# Patient Record
Sex: MALE | Race: WHITE | NOT HISPANIC OR LATINO | Employment: FULL TIME | URBAN - METROPOLITAN AREA
[De-identification: names, ages, dates, MRNs, and addresses within clinical notes are randomized per-mention and may not be internally consistent; named-entity substitution may affect disease eponyms.]

---

## 2023-04-17 ENCOUNTER — HOSPITAL ENCOUNTER (EMERGENCY)
Facility: HOSPITAL | Age: 58
Discharge: HOME | End: 2023-04-17
Attending: EMERGENCY MEDICINE
Payer: COMMERCIAL

## 2023-04-17 VITALS
BODY MASS INDEX: 33.3 KG/M2 | OXYGEN SATURATION: 99 % | TEMPERATURE: 98 F | HEIGHT: 71 IN | WEIGHT: 237.88 LBS | SYSTOLIC BLOOD PRESSURE: 131 MMHG | RESPIRATION RATE: 18 BRPM | HEART RATE: 63 BPM | DIASTOLIC BLOOD PRESSURE: 82 MMHG
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
## 2023-04-17 DIAGNOSIS — R07.89 CHEST WALL PAIN: ICD-10-CM

## 2023-04-17 DIAGNOSIS — S22.22XA CLOSED FRACTURE OF BODY OF STERNUM, INITIAL ENCOUNTER: Primary | ICD-10-CM

## 2023-04-17 DIAGNOSIS — R07.9 CHEST PAIN: ICD-10-CM

## 2023-04-17 LAB
ALBUMIN SERPL BCP-MCNC: 4.5 G/DL (ref 3.5–5.2)
ALP SERPL-CCNC: 61 U/L (ref 55–135)
ALT SERPL W/O P-5'-P-CCNC: 41 U/L (ref 10–44)
ANION GAP SERPL CALC-SCNC: 8 MMOL/L (ref 8–16)
AST SERPL-CCNC: 38 U/L (ref 10–40)
BASOPHILS # BLD AUTO: 0.07 K/UL (ref 0–0.2)
BASOPHILS NFR BLD: 0.7 % (ref 0–1.9)
BILIRUB SERPL-MCNC: 0.5 MG/DL (ref 0.1–1)
BNP SERPL-MCNC: 10 PG/ML (ref 0–99)
BUN SERPL-MCNC: 13 MG/DL (ref 6–20)
CALCIUM SERPL-MCNC: 9.5 MG/DL (ref 8.7–10.5)
CHLORIDE SERPL-SCNC: 105 MMOL/L (ref 95–110)
CK SERPL-CCNC: 608 U/L (ref 20–200)
CO2 SERPL-SCNC: 26 MMOL/L (ref 23–29)
CREAT SERPL-MCNC: 1 MG/DL (ref 0.5–1.4)
DIFFERENTIAL METHOD: ABNORMAL
EOSINOPHIL # BLD AUTO: 0.1 K/UL (ref 0–0.5)
EOSINOPHIL NFR BLD: 1.3 % (ref 0–8)
ERYTHROCYTE [DISTWIDTH] IN BLOOD BY AUTOMATED COUNT: 12.3 % (ref 11.5–14.5)
EST. GFR  (NO RACE VARIABLE): >60 ML/MIN/1.73 M^2
GLUCOSE SERPL-MCNC: 93 MG/DL (ref 70–110)
HCT VFR BLD AUTO: 44.6 % (ref 40–54)
HGB BLD-MCNC: 15.3 G/DL (ref 14–18)
IMM GRANULOCYTES # BLD AUTO: 0.02 K/UL (ref 0–0.04)
IMM GRANULOCYTES NFR BLD AUTO: 0.2 % (ref 0–0.5)
LYMPHOCYTES # BLD AUTO: 3.8 K/UL (ref 1–4.8)
LYMPHOCYTES NFR BLD: 40.7 % (ref 18–48)
MCH RBC QN AUTO: 31.2 PG (ref 27–31)
MCHC RBC AUTO-ENTMCNC: 34.3 G/DL (ref 32–36)
MCV RBC AUTO: 91 FL (ref 82–98)
MONOCYTES # BLD AUTO: 0.7 K/UL (ref 0.3–1)
MONOCYTES NFR BLD: 7.5 % (ref 4–15)
NEUTROPHILS # BLD AUTO: 4.6 K/UL (ref 1.8–7.7)
NEUTROPHILS NFR BLD: 49.6 % (ref 38–73)
NRBC BLD-RTO: 0 /100 WBC
PLATELET # BLD AUTO: 188 K/UL (ref 150–450)
PMV BLD AUTO: 13 FL (ref 9.2–12.9)
POTASSIUM SERPL-SCNC: 4 MMOL/L (ref 3.5–5.1)
PROT SERPL-MCNC: 7.3 G/DL (ref 6–8.4)
RBC # BLD AUTO: 4.9 M/UL (ref 4.6–6.2)
SODIUM SERPL-SCNC: 139 MMOL/L (ref 136–145)
TROPONIN I SERPL DL<=0.01 NG/ML-MCNC: <0.006 NG/ML (ref 0–0.03)
WBC # BLD AUTO: 9.34 K/UL (ref 3.9–12.7)

## 2023-04-17 PROCEDURE — 93005 ELECTROCARDIOGRAM TRACING: CPT | Mod: ER

## 2023-04-17 PROCEDURE — 25500020 PHARM REV CODE 255: Mod: ER | Performed by: EMERGENCY MEDICINE

## 2023-04-17 PROCEDURE — 93010 EKG 12-LEAD: ICD-10-PCS | Mod: ,,, | Performed by: INTERNAL MEDICINE

## 2023-04-17 PROCEDURE — 84484 ASSAY OF TROPONIN QUANT: CPT | Mod: ER | Performed by: EMERGENCY MEDICINE

## 2023-04-17 PROCEDURE — 82550 ASSAY OF CK (CPK): CPT | Mod: ER | Performed by: EMERGENCY MEDICINE

## 2023-04-17 PROCEDURE — 85025 COMPLETE CBC W/AUTO DIFF WBC: CPT | Mod: ER | Performed by: EMERGENCY MEDICINE

## 2023-04-17 PROCEDURE — 93010 ELECTROCARDIOGRAM REPORT: CPT | Mod: ,,, | Performed by: INTERNAL MEDICINE

## 2023-04-17 PROCEDURE — 83880 ASSAY OF NATRIURETIC PEPTIDE: CPT | Mod: ER | Performed by: EMERGENCY MEDICINE

## 2023-04-17 PROCEDURE — 80053 COMPREHEN METABOLIC PANEL: CPT | Mod: ER | Performed by: EMERGENCY MEDICINE

## 2023-04-17 PROCEDURE — 99285 EMERGENCY DEPT VISIT HI MDM: CPT | Mod: 25,ER

## 2023-04-17 RX ORDER — OXYCODONE AND ACETAMINOPHEN 10; 325 MG/1; MG/1
1 TABLET ORAL EVERY 6 HOURS PRN
Qty: 12 TABLET | Refills: 0 | Status: SHIPPED | OUTPATIENT
Start: 2023-04-17

## 2023-04-17 RX ADMIN — IOHEXOL 75 ML: 350 INJECTION, SOLUTION INTRAVENOUS at 08:04

## 2023-04-18 ENCOUNTER — TELEPHONE (OUTPATIENT)
Dept: ORTHOPEDICS | Facility: CLINIC | Age: 58
End: 2023-04-18
Payer: COMMERCIAL

## 2023-04-18 NOTE — TELEPHONE ENCOUNTER
Spoke with patient and scheduled an appointment with Dr. Sudhakar Blevins for his sternum fracture per Dr Katz. Patient verbalized understanding of appointment date, time and location.
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
## 2023-04-27 ENCOUNTER — OFFICE VISIT (OUTPATIENT)
Dept: CARDIOTHORACIC SURGERY | Facility: CLINIC | Age: 58
End: 2023-04-27
Payer: COMMERCIAL

## 2023-04-27 VITALS
HEART RATE: 70 BPM | DIASTOLIC BLOOD PRESSURE: 78 MMHG | SYSTOLIC BLOOD PRESSURE: 118 MMHG | HEIGHT: 71 IN | BODY MASS INDEX: 32.87 KG/M2 | WEIGHT: 234.81 LBS | OXYGEN SATURATION: 94 %

## 2023-04-27 DIAGNOSIS — S22.20XA CLOSED FRACTURE OF STERNUM, UNSPECIFIED PORTION OF STERNUM, INITIAL ENCOUNTER: Primary | ICD-10-CM

## 2023-04-27 PROCEDURE — 99999 PR PBB SHADOW E&M-EST. PATIENT-LVL III: ICD-10-PCS | Mod: PBBFAC,,, | Performed by: THORACIC SURGERY (CARDIOTHORACIC VASCULAR SURGERY)

## 2023-04-27 PROCEDURE — 3074F SYST BP LT 130 MM HG: CPT | Mod: CPTII,S$GLB,, | Performed by: THORACIC SURGERY (CARDIOTHORACIC VASCULAR SURGERY)

## 2023-04-27 PROCEDURE — 99203 OFFICE O/P NEW LOW 30 MIN: CPT | Mod: S$GLB,,, | Performed by: THORACIC SURGERY (CARDIOTHORACIC VASCULAR SURGERY)

## 2023-04-27 PROCEDURE — 3074F PR MOST RECENT SYSTOLIC BLOOD PRESSURE < 130 MM HG: ICD-10-PCS | Mod: CPTII,S$GLB,, | Performed by: THORACIC SURGERY (CARDIOTHORACIC VASCULAR SURGERY)

## 2023-04-27 PROCEDURE — 3008F PR BODY MASS INDEX (BMI) DOCUMENTED: ICD-10-PCS | Mod: CPTII,S$GLB,, | Performed by: THORACIC SURGERY (CARDIOTHORACIC VASCULAR SURGERY)

## 2023-04-27 PROCEDURE — 3078F DIAST BP <80 MM HG: CPT | Mod: CPTII,S$GLB,, | Performed by: THORACIC SURGERY (CARDIOTHORACIC VASCULAR SURGERY)

## 2023-04-27 PROCEDURE — 3008F BODY MASS INDEX DOCD: CPT | Mod: CPTII,S$GLB,, | Performed by: THORACIC SURGERY (CARDIOTHORACIC VASCULAR SURGERY)

## 2023-04-27 PROCEDURE — 99999 PR PBB SHADOW E&M-EST. PATIENT-LVL III: CPT | Mod: PBBFAC,,, | Performed by: THORACIC SURGERY (CARDIOTHORACIC VASCULAR SURGERY)

## 2023-04-27 PROCEDURE — 3078F PR MOST RECENT DIASTOLIC BLOOD PRESSURE < 80 MM HG: ICD-10-PCS | Mod: CPTII,S$GLB,, | Performed by: THORACIC SURGERY (CARDIOTHORACIC VASCULAR SURGERY)

## 2023-04-27 PROCEDURE — 99203 PR OFFICE/OUTPT VISIT, NEW, LEVL III, 30-44 MIN: ICD-10-PCS | Mod: S$GLB,,, | Performed by: THORACIC SURGERY (CARDIOTHORACIC VASCULAR SURGERY)

## 2023-04-27 RX ORDER — ATORVASTATIN CALCIUM 10 MG/1
10 TABLET, FILM COATED ORAL DAILY
COMMUNITY
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
## 2023-05-09 ENCOUNTER — TELEPHONE (OUTPATIENT)
Dept: CARDIOLOGY | Facility: CLINIC | Age: 58
End: 2023-05-09
Payer: COMMERCIAL

## 2023-05-09 NOTE — TELEPHONE ENCOUNTER
Patient contacted the office and was advised that he can drop off the paper work to the [place] location. The patient was advised that the provider was not available to look at the paper work today. He will look at the paper work tomorrow. The patient stated understanding with no questions or concern.    ----- Message from Jai Washington sent at 5/5/2023 12:31 PM CDT -----  Contact: Abdullahi  Patient is calling to speak with the nurse in regards to paperwork. Report checking receipt of paperwork for fmla/std from employer. Please give patient a callback at [phone].

## 2023-10-03 ENCOUNTER — HOSPITAL ENCOUNTER (EMERGENCY)
Facility: HOSPITAL | Age: 58
Discharge: HOME | End: 2023-10-03
Attending: EMERGENCY MEDICINE
Payer: COMMERCIAL

## 2023-10-03 VITALS
DIASTOLIC BLOOD PRESSURE: 71 MMHG | RESPIRATION RATE: 17 BRPM | SYSTOLIC BLOOD PRESSURE: 141 MMHG | BODY MASS INDEX: 33.96 KG/M2 | TEMPERATURE: 98 F | HEART RATE: 66 BPM | OXYGEN SATURATION: 99 % | WEIGHT: 243.5 LBS

## 2023-10-03 DIAGNOSIS — R07.9 CHEST PAIN: ICD-10-CM

## 2023-10-03 DIAGNOSIS — R09.1 PLEURITIS: Primary | ICD-10-CM

## 2023-10-03 LAB
ALBUMIN SERPL BCP-MCNC: 4.3 G/DL (ref 3.5–5.2)
ALP SERPL-CCNC: 52 U/L (ref 55–135)
ALT SERPL W/O P-5'-P-CCNC: 41 U/L (ref 10–44)
ANION GAP SERPL CALC-SCNC: 9 MMOL/L (ref 8–16)
AST SERPL-CCNC: 31 U/L (ref 10–40)
BASOPHILS # BLD AUTO: 0.07 K/UL (ref 0–0.2)
BASOPHILS NFR BLD: 0.8 % (ref 0–1.9)
BILIRUB SERPL-MCNC: 0.4 MG/DL (ref 0.1–1)
BNP SERPL-MCNC: <10 PG/ML (ref 0–99)
BUN SERPL-MCNC: 26 MG/DL (ref 6–20)
CALCIUM SERPL-MCNC: 8.9 MG/DL (ref 8.7–10.5)
CHLORIDE SERPL-SCNC: 109 MMOL/L (ref 95–110)
CO2 SERPL-SCNC: 22 MMOL/L (ref 23–29)
CREAT SERPL-MCNC: 2 MG/DL (ref 0.5–1.4)
DIFFERENTIAL METHOD: ABNORMAL
EOSINOPHIL # BLD AUTO: 0.2 K/UL (ref 0–0.5)
EOSINOPHIL NFR BLD: 2 % (ref 0–8)
ERYTHROCYTE [DISTWIDTH] IN BLOOD BY AUTOMATED COUNT: 12.2 % (ref 11.5–14.5)
EST. GFR  (NO RACE VARIABLE): 38 ML/MIN/1.73 M^2
GLUCOSE SERPL-MCNC: 100 MG/DL (ref 70–110)
HCT VFR BLD AUTO: 45.1 % (ref 40–54)
HGB BLD-MCNC: 15.3 G/DL (ref 14–18)
IMM GRANULOCYTES # BLD AUTO: 0.03 K/UL (ref 0–0.04)
IMM GRANULOCYTES NFR BLD AUTO: 0.3 % (ref 0–0.5)
LYMPHOCYTES # BLD AUTO: 3.8 K/UL (ref 1–4.8)
LYMPHOCYTES NFR BLD: 42.3 % (ref 18–48)
MCH RBC QN AUTO: 31 PG (ref 27–31)
MCHC RBC AUTO-ENTMCNC: 33.9 G/DL (ref 32–36)
MCV RBC AUTO: 92 FL (ref 82–98)
MONOCYTES # BLD AUTO: 0.8 K/UL (ref 0.3–1)
MONOCYTES NFR BLD: 9.3 % (ref 4–15)
NEUTROPHILS # BLD AUTO: 4.1 K/UL (ref 1.8–7.7)
NEUTROPHILS NFR BLD: 45.3 % (ref 38–73)
NRBC BLD-RTO: 0 /100 WBC
PLATELET # BLD AUTO: 166 K/UL (ref 150–450)
PMV BLD AUTO: 13 FL (ref 9.2–12.9)
POTASSIUM SERPL-SCNC: 3.9 MMOL/L (ref 3.5–5.1)
PROT SERPL-MCNC: 7.1 G/DL (ref 6–8.4)
RBC # BLD AUTO: 4.93 M/UL (ref 4.6–6.2)
SODIUM SERPL-SCNC: 140 MMOL/L (ref 136–145)
TROPONIN I SERPL DL<=0.01 NG/ML-MCNC: <0.006 NG/ML (ref 0–0.03)
TROPONIN I SERPL DL<=0.01 NG/ML-MCNC: <0.006 NG/ML (ref 0–0.03)
WBC # BLD AUTO: 9.08 K/UL (ref 3.9–12.7)

## 2023-10-03 PROCEDURE — 83880 ASSAY OF NATRIURETIC PEPTIDE: CPT | Performed by: EMERGENCY MEDICINE

## 2023-10-03 PROCEDURE — 25000003 PHARM REV CODE 250: Mod: ER | Performed by: EMERGENCY MEDICINE

## 2023-10-03 PROCEDURE — 96360 HYDRATION IV INFUSION INIT: CPT | Mod: 59,ER

## 2023-10-03 PROCEDURE — 85025 COMPLETE CBC W/AUTO DIFF WBC: CPT | Mod: ER | Performed by: EMERGENCY MEDICINE

## 2023-10-03 PROCEDURE — 93010 EKG 12-LEAD: ICD-10-PCS | Mod: ,,, | Performed by: INTERNAL MEDICINE

## 2023-10-03 PROCEDURE — 93010 ELECTROCARDIOGRAM REPORT: CPT | Mod: ,,, | Performed by: INTERNAL MEDICINE

## 2023-10-03 PROCEDURE — 99285 EMERGENCY DEPT VISIT HI MDM: CPT | Mod: 25,ER

## 2023-10-03 PROCEDURE — 84484 ASSAY OF TROPONIN QUANT: CPT | Mod: ER | Performed by: EMERGENCY MEDICINE

## 2023-10-03 PROCEDURE — 84484 ASSAY OF TROPONIN QUANT: CPT | Mod: 91 | Performed by: EMERGENCY MEDICINE

## 2023-10-03 PROCEDURE — 25500020 PHARM REV CODE 255: Mod: ER | Performed by: EMERGENCY MEDICINE

## 2023-10-03 PROCEDURE — 94761 N-INVAS EAR/PLS OXIMETRY MLT: CPT | Mod: ER

## 2023-10-03 PROCEDURE — 93005 ELECTROCARDIOGRAM TRACING: CPT | Mod: ER

## 2023-10-03 PROCEDURE — 80053 COMPREHEN METABOLIC PANEL: CPT | Performed by: EMERGENCY MEDICINE

## 2023-10-03 RX ORDER — ASPIRIN 325 MG
325 TABLET ORAL
Status: COMPLETED | OUTPATIENT
Start: 2023-10-03 | End: 2023-10-03

## 2023-10-03 RX ORDER — ROSUVASTATIN CALCIUM 10 MG/1
10 TABLET, COATED ORAL NIGHTLY
COMMUNITY
Start: 2023-04-18

## 2023-10-03 RX ADMIN — ASPIRIN 325 MG: 325 TABLET ORAL at 05:10

## 2023-10-03 RX ADMIN — SODIUM CHLORIDE 1000 ML: 9 INJECTION, SOLUTION INTRAVENOUS at 09:10

## 2023-10-03 RX ADMIN — IOHEXOL 100 ML: 350 INJECTION, SOLUTION INTRAVENOUS at 06:10

## 2023-10-03 RX ADMIN — SODIUM CHLORIDE 1000 ML: 9 INJECTION, SOLUTION INTRAVENOUS at 07:10

## 2023-10-03 NOTE — ED PROVIDER NOTES
Encounter Date: 10/3/2023       History     Chief Complaint   Patient presents with    Shortness of Breath     Worsening SOB x1 week. Sent by PCP for evaluation of possible PE    Chest Pain     Throbbing mid sternal chest pain     The history is provided by the patient.   Chest Pain  The current episode started several days ago. Duration of episode(s) is 1 week. Chest pain occurs constantly. The chest pain is improving. The pain is associated with breathing and coughing. Pain scale at highest: mild. Pain scale currently: mild. The quality of the pain is described as dull and aching. The pain does not radiate. Chest pain is worsened by deep breathing. Primary symptoms include shortness of breath and cough (occasional, improved at this point in time per pt report). Pertinent negatives for primary symptoms include no fever, no fatigue, no syncope, no wheezing, no palpitations, no abdominal pain, no nausea, no vomiting, no dizziness and no altered mental status.   The shortness of breath began  more than 2 days ago (pt just drove back from [place] last week). The shortness of breath developed gradually. The shortness of breath is mild. The patient's medical history does not include CHF, COPD, asthma or chronic lung disease.   The cough is non-productive. There is nondescript sputum produced.   Pertinent negatives for associated symptoms include no claudication, no diaphoresis, no lower extremity edema, no near-syncope, no numbness, no orthopnea, no paroxysmal nocturnal dyspnea and no weakness. He tried nothing for the symptoms. Risk factors include male gender.   His past medical history is significant for hyperlipidemia and hypertension.     Review of patient's allergies indicates:  No Known Allergies  Past Medical History:   Diagnosis Date    High cholesterol     Hypertension      History reviewed. No pertinent surgical history.  Family History   Problem Relation Age of Onset    Colon cancer Mother     Lung cancer  Mother     Diabetes Father      Social History     Tobacco Use    Smoking status: Unknown     Passive exposure: Past    Smokeless tobacco: Never   Substance Use Topics    Alcohol use: Yes     Alcohol/week: 1.0 standard drink of alcohol     Types: 1 Shots of liquor per week    Drug use: Never     Review of Systems   Constitutional:  Negative for diaphoresis, fatigue and fever.   HENT:  Negative for sore throat.    Respiratory:  Positive for cough (occasional, improved at this point in time per pt report) and shortness of breath. Negative for wheezing.    Cardiovascular:  Positive for chest pain. Negative for palpitations, orthopnea, claudication, syncope and near-syncope.   Gastrointestinal:  Negative for abdominal pain, nausea and vomiting.   Genitourinary:  Negative for dysuria.   Musculoskeletal:  Negative for back pain.   Skin:  Negative for rash.   Neurological:  Negative for dizziness, weakness and numbness.   Hematological:  Does not bruise/bleed easily.   All other systems reviewed and are negative.      Physical Exam     Initial Vitals [10/03/23 1703]   BP Pulse Resp Temp SpO2   138/76 70 20 98.1 °F (36.7 °C) 95 %      MAP       --         Physical Exam    Nursing note and vitals reviewed.  Constitutional: He appears well-developed and well-nourished. He is not diaphoretic. No distress.   HENT:   Head: Normocephalic and atraumatic.   Right Ear: Hearing normal.   Left Ear: Hearing normal.   Nose: Nose normal.   Mouth/Throat: Uvula is midline, oropharynx is clear and moist and mucous membranes are normal.   Eyes: EOM are normal. Pupils are equal, round, and reactive to light. No scleral icterus.   Neck: Neck supple. No thyromegaly present.   Normal range of motion.  Cardiovascular:  Normal rate, regular rhythm, normal heart sounds and intact distal pulses.     Exam reveals no gallop and no friction rub.       No murmur heard.  Pulmonary/Chest: Effort normal and breath sounds normal. No respiratory distress. He  has no decreased breath sounds. He has no wheezes. He has no rhonchi. He exhibits no tenderness.   Abdominal: Abdomen is soft. Bowel sounds are normal. He exhibits no distension. There is no abdominal tenderness. There is no rebound and no guarding.   Musculoskeletal:         General: No tenderness or edema. Normal range of motion.      Cervical back: Normal range of motion and neck supple.     Lymphadenopathy:     He has no cervical adenopathy.   Neurological: He is alert and oriented to person, place, and time. He has normal strength. No cranial nerve deficit or sensory deficit. GCS eye subscore is 4. GCS verbal subscore is 5. GCS motor subscore is 6.   No acute focal neuro deficits   Skin: Skin is warm and dry.   Psychiatric: He has a normal mood and affect. His behavior is normal. Judgment and thought content normal.         ED Course   Procedures  Labs Reviewed   CBC W/ AUTO DIFFERENTIAL - Abnormal; Notable for the following components:       Result Value    MPV 13.0 (*)     All other components within normal limits   COMPREHENSIVE METABOLIC PANEL - Abnormal; Notable for the following components:    CO2 22 (*)     BUN 26 (*)     Creatinine 2.0 (*)     Alkaline Phosphatase 52 (*)     eGFR 38 (*)     All other components within normal limits   TROPONIN I   TROPONIN I   B-TYPE NATRIURETIC PEPTIDE     EKG Readings: (Independently Interpreted)   Initial Reading: No STEMI. Previous EKG: Compared with most recent EKG Previous EKG Date: 4/17/23. Rhythm: Normal Sinus Rhythm. Heart Rate: 63. Ectopy: No Ectopy. Conduction: Normal. ST Segments: Normal ST Segments. T Waves: Normal. Axis: Normal. Clinical Impression: Normal Sinus Rhythm       Imaging Results              CTA Chest Non-Coronary (PE Studies) (Final result)  Result time 10/03/23 19:26:24      Final result by Chaz Hardy MD (10/03/23 19:26:24)                   Impression:      No acute abnormality identified.  No pulmonary embolus.  No acute pulmonary  opacities aside from bandlike atelectasis.    All CT scans at this facility are performed  using dose modulation techniques as appropriate to performed exam including the following:  automated exposure control; adjustment of mA and/or kV according to the patients size (this includes techniques or standardized protocols for targeted exams where dose is matched to indication/reason for exam: i.e. extremities or head);  iterative reconstruction technique.      Electronically signed by: Chaz Hardy  Date:    10/03/2023  Time:    19:26               Narrative:    EXAMINATION:  CTA CHEST NON CORONARY (PE STUDIES)    CLINICAL HISTORY:  Pulmonary embolism (PE) suspected, high prob;    TECHNIQUE:  Low dose axial images, sagittal and coronal reformations were obtained from the thoracic inlet to the lung bases following the IV administration of 100 mL of Omnipaque 350.  Contrast timing was optimized to evaluate the pulmonary arteries.  MIP images were performed.    COMPARISON:  None    FINDINGS:  Base of Neck: No significant abnormality.    Thoracic soft tissues: Unremarkable.    Aorta: Left-sided aortic arch.  No aneurysm and no significant atherosclerosis    Heart: Normal size. No effusion.    Pulmonary vasculature: Pulmonary arteries are well opacified.  There is no pulmonary thromboembolism.    Yvette/Mediastinum: No pathologic graciela enlargement.    Airways: Patent.    Lungs/Pleura: Bandlike atelectasis.  Otherwise clear lungs. No pleural effusion or thickening.    Esophagus: Unremarkable.    Upper Abdomen: No abnormality of the partially imaged upper abdomen.    Bones: No acute fracture. No suspicious lytic or sclerotic lesions.                                       Medications   sodium chloride 0.9% bolus 1,000 mL 1,000 mL (1,000 mLs Intravenous New Bag 10/3/23 2106)   aspirin tablet 325 mg (325 mg Oral Given 10/3/23 1742)   sodium chloride 0.9% bolus 1,000 mL 1,000 mL (0 mLs Intravenous Stopped 10/3/23 2000)   iohexoL  (OMNIPAQUE 350) injection 100 mL (100 mLs Intravenous Given 10/3/23 1534)     Medical Decision Making  Amount and/or Complexity of Data Reviewed  Labs: ordered.  Radiology: ordered.    Risk  OTC drugs.  Prescription drug management.                  Vitals:    10/03/23 1703 10/03/23 1706 10/03/23 1717 10/03/23 1731   BP: 138/76 133/76 136/81 137/71   Pulse: 70 70 66 65   Resp: 20  16 (!) 22   Temp: 98.1 °F (36.7 °C)      TempSrc: Oral      SpO2: 95% 97% 98% 98%   Weight: 110.5 kg (243 lb 8 oz)       10/03/23 1747 10/03/23 1847 10/03/23 1902 10/03/23 2002   BP:  131/69 125/73 129/73   Pulse: 69 74 65 61   Resp: 19 16 18    Temp:       TempSrc:       SpO2: 96% 96% 97% 99%   Weight:        10/03/23 2003 10/03/23 2108   BP:  104/66   Pulse:  64   Resp: 17 16   Temp:     TempSrc:     SpO2:  97%   Weight:         Results for orders placed or performed during the hospital encounter of 10/03/23   CBC auto differential   Result Value Ref Range    WBC 9.08 3.90 - 12.70 K/uL    RBC 4.93 4.60 - 6.20 M/uL    Hemoglobin 15.3 14.0 - 18.0 g/dL    Hematocrit 45.1 40.0 - 54.0 %    MCV 92 82 - 98 fL    MCH 31.0 27.0 - 31.0 pg    MCHC 33.9 32.0 - 36.0 g/dL    RDW 12.2 11.5 - 14.5 %    Platelets 166 150 - 450 K/uL    MPV 13.0 (H) 9.2 - 12.9 fL    Immature Granulocytes 0.3 0.0 - 0.5 %    Gran # (ANC) 4.1 1.8 - 7.7 K/uL    Immature Grans (Abs) 0.03 0.00 - 0.04 K/uL    Lymph # 3.8 1.0 - 4.8 K/uL    Mono # 0.8 0.3 - 1.0 K/uL    Eos # 0.2 0.0 - 0.5 K/uL    Baso # 0.07 0.00 - 0.20 K/uL    nRBC 0 0 /100 WBC    Gran % 45.3 38.0 - 73.0 %    Lymph % 42.3 18.0 - 48.0 %    Mono % 9.3 4.0 - 15.0 %    Eosinophil % 2.0 0.0 - 8.0 %    Basophil % 0.8 0.0 - 1.9 %    Differential Method Automated    Comprehensive metabolic panel   Result Value Ref Range    Sodium 140 136 - 145 mmol/L    Potassium 3.9 3.5 - 5.1 mmol/L    Chloride 109 95 - 110 mmol/L    CO2 22 (L) 23 - 29 mmol/L    Glucose 100 70 - 110 mg/dL    BUN 26 (H) 6 - 20 mg/dL    Creatinine 2.0 (H)  0.5 - 1.4 mg/dL    Calcium 8.9 8.7 - 10.5 mg/dL    Total Protein 7.1 6.0 - 8.4 g/dL    Albumin 4.3 3.5 - 5.2 g/dL    Total Bilirubin 0.4 0.1 - 1.0 mg/dL    Alkaline Phosphatase 52 (L) 55 - 135 U/L    AST 31 10 - 40 U/L    ALT 41 10 - 44 U/L    eGFR 38 (A) >60 mL/min/1.73 m^2    Anion Gap 9 8 - 16 mmol/L   Troponin I #1   Result Value Ref Range    Troponin I <0.006 0.000 - 0.026 ng/mL   Troponin I #2   Result Value Ref Range    Troponin I <0.006 0.000 - 0.026 ng/mL   BNP   Result Value Ref Range    BNP <10 0 - 99 pg/mL         Imaging Results              CTA Chest Non-Coronary (PE Studies) (Final result)  Result time 10/03/23 19:26:24      Final result by Chaz Hardy MD (10/03/23 19:26:24)                   Impression:      No acute abnormality identified.  No pulmonary embolus.  No acute pulmonary opacities aside from bandlike atelectasis.    All CT scans at this facility are performed  using dose modulation techniques as appropriate to performed exam including the following:  automated exposure control; adjustment of mA and/or kV according to the patients size (this includes techniques or standardized protocols for targeted exams where dose is matched to indication/reason for exam: i.e. extremities or head);  iterative reconstruction technique.      Electronically signed by: Chaz Hardy  Date:    10/03/2023  Time:    19:26               Narrative:    EXAMINATION:  CTA CHEST NON CORONARY (PE STUDIES)    CLINICAL HISTORY:  Pulmonary embolism (PE) suspected, high prob;    TECHNIQUE:  Low dose axial images, sagittal and coronal reformations were obtained from the thoracic inlet to the lung bases following the IV administration of 100 mL of Omnipaque 350.  Contrast timing was optimized to evaluate the pulmonary arteries.  MIP images were performed.    COMPARISON:  None    FINDINGS:  Base of Neck: No significant abnormality.    Thoracic soft tissues: Unremarkable.    Aorta: Left-sided aortic arch.  No aneurysm  and no significant atherosclerosis    Heart: Normal size. No effusion.    Pulmonary vasculature: Pulmonary arteries are well opacified.  There is no pulmonary thromboembolism.    Yvette/Mediastinum: No pathologic graciela enlargement.    Airways: Patent.    Lungs/Pleura: Bandlike atelectasis.  Otherwise clear lungs. No pleural effusion or thickening.    Esophagus: Unremarkable.    Upper Abdomen: No abnormality of the partially imaged upper abdomen.    Bones: No acute fracture. No suspicious lytic or sclerotic lesions.                                      Medications   sodium chloride 0.9% bolus 1,000 mL 1,000 mL (1,000 mLs Intravenous New Bag 10/3/23 2106)   aspirin tablet 325 mg (325 mg Oral Given 10/3/23 3202)   sodium chloride 0.9% bolus 1,000 mL 1,000 mL (0 mLs Intravenous Stopped 10/3/23 2000)   iohexoL (OMNIPAQUE 350) injection 100 mL (100 mLs Intravenous Given 10/3/23 5124)         7:12 PM - Re-evaluation:  The patient is resting comfortably and is in no acute distress. Discussed potential risks/benefits of doing CTA without current bloodwork.  Previous labs show normal renal function.  Pt understands and does not want to wait for lab transport and results.  Discussed test results and notified of pending labs. Answered questions at this time. Lab delay due to  needed because [employer] is not working.    9:18 PM - Re-evaluation: The patient is resting comfortably and is in no acute distress. He states that his symptoms have improved after treatment within ER. Discussed test results, shared treatment plan, specific conditions for return, and importance of follow up with patient and family.  Advised close f/u c pcp/cardiology within one week, drink plenty of fluids, and to return to ER if any issues arise.  He understands and agrees with the plan as discussed. Answered  his questions at this time. He has remained hemodynamically stable throughout the ED course and is appropriate for discharge home.      Regarding CHEST PAIN, I advised the patient that chest pain can be caused by a range of conditions, from not serious to life-threatening such as: heart attack or a blood clot in your lungs, angina indicating poor blood flow to the heart; infection, inflammation, or a fracture in the bones or cartilage in chest wall; a digestive problem, such as acid reflux or a stomach ulcer; or even an anxiety attack.  Instructed patient to follow up with primary healthcare provider for reevaluation and possible diagnostic studies to find the actual cause of the chest pain. Patient was instructed to call 911 or go to the nearest emergency department if they develop any of the following signs of a heart attack: squeezing, pressure, or pain in the chest that lasts longer than five minutes or returns; discomfort or pain in the back, neck, jaw, stomach, or arm; trouble breathing; nausea or vomiting; lightheadedness;  or a sudden cold sweat, especially with chest pain or trouble breathing.  Also return to the emergency department the chest discomfort gets worse (even with medicine); cough or vomit blood; have bowel movements that are black or bloody; cannot stop vomiting; or develop difficulty swallowing.      Pre-hypertension/Hypertension: The pt has been informed that they may have pre-hypertension or hypertension based on a blood pressure reading in the ED. I recommend that the pt call the PCP listed on their discharge instructions or a physician of their choice this week to arrange f/u for further evaluation of possible pre-hypertension or hypertension.     Abdullahi German was given a handout which discussed their disease process, precautions, and instructions for follow-up and therapy.     Follow-up Information       [facility]. Schedule an appointment as soon as possible for a visit in 1 week.    Specialty: Cardiology  Contact information:  [address] [facility]  [id number] [phone]  Additional information:  Please park on the [address] side and use the Clinic entrance. Check in on the 3rd floor, to the right.             Kevin Tolentino MD.    Specialty: Family Medicine  Why: As needed, If symptoms worsen  Contact information:  [address]  [place] [address]  [phone]               [facility] Emergency Dept.    Specialty: Emergency Medicine  Why: As needed, If symptoms worsen  Contact information:  [address]  [facility] [address] [phone]                              Medication List        ASK your doctor about these medications      atorvastatin 10 MG tablet  Commonly known as: LIPITOR     oxyCODONE-acetaminophen  mg per tablet  Commonly known as: PERCOCET  Take 1 tablet by mouth every 6 (six) hours as needed for Pain.     rosuvastatin 10 MG tablet  Commonly known as: CRESTOR             Current Discharge Medication List            ED Diagnosis  1. Pleuritis    2. Chest pain                     Clinical Impression:   Final diagnoses:  [R07.9] Chest pain  [R09.1] Pleuritis (Primary)        ED Disposition Condition    Discharge Stable          ED Prescriptions    None       Follow-up Information       Follow up With Specialties Details Why Contact Info Additional Information    [facility] Schedule an appointment as soon as possible for a visit in 1 week  [id number] [facility] [id number] [phone] Please park on the [address] side and use the Clinic entrance. Check in on the 3rd floor, to the right.    Kevin Tolentino MD Family Medicine  As needed, If symptoms worsen [address]  [facility] [address]  [phone]       [facility] Emergency Medicine  As needed, If symptoms worsen [address]  [facility] [id number] [phone]              Ruddy Quinones Jr., MD  10/03/23 2122